# Patient Record
Sex: MALE | Race: BLACK OR AFRICAN AMERICAN | Employment: FULL TIME | URBAN - METROPOLITAN AREA
[De-identification: names, ages, dates, MRNs, and addresses within clinical notes are randomized per-mention and may not be internally consistent; named-entity substitution may affect disease eponyms.]

---

## 2020-06-25 ENCOUNTER — HOSPITAL ENCOUNTER (EMERGENCY)
Age: 45
Discharge: HOME | End: 2020-06-25
Payer: MEDICAID

## 2020-06-25 VITALS
RESPIRATION RATE: 18 BRPM | OXYGEN SATURATION: 100 % | DIASTOLIC BLOOD PRESSURE: 102 MMHG | WEIGHT: 267 LBS | SYSTOLIC BLOOD PRESSURE: 164 MMHG | TEMPERATURE: 98.8 F | HEIGHT: 73 IN | BODY MASS INDEX: 35.39 KG/M2 | HEART RATE: 79 BPM

## 2020-06-25 PROCEDURE — 99282 EMERGENCY DEPT VISIT SF MDM: CPT

## 2020-06-25 PROCEDURE — 6370000000 HC RX 637 (ALT 250 FOR IP): Performed by: PHYSICIAN ASSISTANT

## 2020-06-25 RX ORDER — IBUPROFEN 400 MG/1
800 TABLET ORAL ONCE
Status: COMPLETED | OUTPATIENT
Start: 2020-06-25 | End: 2020-06-25

## 2020-06-25 RX ORDER — NAPROXEN 500 MG/1
500 TABLET ORAL 2 TIMES DAILY WITH MEALS
Qty: 30 TABLET | Refills: 0 | Status: SHIPPED | OUTPATIENT
Start: 2020-06-25 | End: 2021-09-12 | Stop reason: SDUPTHER

## 2020-06-25 RX ORDER — PENICILLIN V POTASSIUM 500 MG/1
500 TABLET ORAL 4 TIMES DAILY
Qty: 28 TABLET | Refills: 0 | Status: SHIPPED | OUTPATIENT
Start: 2020-06-25 | End: 2020-07-02

## 2020-06-25 RX ADMIN — IBUPROFEN 800 MG: 400 TABLET, FILM COATED ORAL at 20:38

## 2020-06-25 ASSESSMENT — PAIN SCALES - GENERAL
PAINLEVEL_OUTOF10: 8
PAINLEVEL_OUTOF10: 8

## 2020-06-25 ASSESSMENT — ENCOUNTER SYMPTOMS
SORE THROAT: 0
ABDOMINAL PAIN: 0
VOICE CHANGE: 0
VOMITING: 0
COUGH: 0
NAUSEA: 0
TROUBLE SWALLOWING: 0
FACIAL SWELLING: 0
RHINORRHEA: 0
SHORTNESS OF BREATH: 0
BACK PAIN: 0
STRIDOR: 0
CHEST TIGHTNESS: 0

## 2020-06-26 NOTE — ED NOTES
Patient prepared for and ready to be discharged. Patient discharged at this time in no acute distress after verbalizing understanding of discharge instructions. Patient left after receiving After Visit Summary instructions.       Noemi Ventura RN  06/25/20 [id number]

## 2020-06-26 NOTE — ED PROVIDER NOTES
Tylenol for pain. He is referred to dental clinic. If fevers, facial swelling, trismus or worsening symptoms he can return emergency department. Patient stable for discharge. This patient was also evaluated by the attending physician. All care plans were discussed and agreed upon. Clinical Impression     1. Pain, dental    2.  Elevated blood pressure reading        Disposition     PATIENT REFERRED TO:  [facility] Emergency Department  [address]  [phone]    If symptoms worsen    dental clinic    Schedule an appointment as soon as possible for a visit       [facility] [address] [facility]    Schedule an appointment as soon as possible for a visit   for blood pressure recheck      DISCHARGE MEDICATIONS:  New Prescriptions    NAPROXEN (NAPROSYN) 500 MG TABLET    Take 1 tablet by mouth 2 times daily (with meals) for 30 doses    PENICILLIN V POTASSIUM (VEETID) 500 MG TABLET    Take 1 tablet by mouth 4 times daily for 7 days       DISPOSITION Decision To Discharge 06/25/2020 09:14:32 PM        Maite Berryma  06/25/20 8759

## 2021-09-12 ENCOUNTER — HOSPITAL ENCOUNTER (EMERGENCY)
Age: 46
Discharge: HOME | End: 2021-09-12
Attending: EMERGENCY MEDICINE
Payer: MEDICAID

## 2021-09-12 VITALS — RESPIRATION RATE: 17 BRPM | OXYGEN SATURATION: 98 % | HEART RATE: 88 BPM | TEMPERATURE: 98 F

## 2021-09-12 DIAGNOSIS — S39.012A STRAIN OF LUMBAR REGION, INITIAL ENCOUNTER: Primary | ICD-10-CM

## 2021-09-12 PROCEDURE — 6370000000 HC RX 637 (ALT 250 FOR IP): Performed by: EMERGENCY MEDICINE

## 2021-09-12 PROCEDURE — 99283 EMERGENCY DEPT VISIT LOW MDM: CPT

## 2021-09-12 RX ORDER — LIDOCAINE 50 MG/G
1 PATCH TOPICAL DAILY
Qty: 15 PATCH | Refills: 0 | Status: SHIPPED | OUTPATIENT
Start: 2021-09-12 | End: 2021-09-27

## 2021-09-12 RX ORDER — IBUPROFEN 400 MG/1
800 TABLET ORAL ONCE
Status: COMPLETED | OUTPATIENT
Start: 2021-09-12 | End: 2021-09-12

## 2021-09-12 RX ORDER — LIDOCAINE 4 G/G
1 PATCH TOPICAL ONCE
Status: DISCONTINUED | OUTPATIENT
Start: 2021-09-12 | End: 2021-09-12 | Stop reason: HOSPADM

## 2021-09-12 RX ORDER — LIDOCAINE 4 G/G
1 PATCH TOPICAL DAILY
Status: DISCONTINUED | OUTPATIENT
Start: 2021-09-13 | End: 2021-09-12

## 2021-09-12 RX ORDER — NAPROXEN 500 MG/1
500 TABLET ORAL 2 TIMES DAILY WITH MEALS
Qty: 20 TABLET | Refills: 0 | Status: SHIPPED | OUTPATIENT
Start: 2021-09-12 | End: 2021-09-22

## 2021-09-12 RX ADMIN — IBUPROFEN 800 MG: 400 TABLET, FILM COATED ORAL at 21:19

## 2021-09-12 ASSESSMENT — PAIN SCALES - GENERAL: PAINLEVEL_OUTOF10: 8

## 2021-09-12 ASSESSMENT — ENCOUNTER SYMPTOMS
BACK PAIN: 1
ABDOMINAL PAIN: 0

## 2021-09-13 NOTE — ED PROVIDER NOTES
[address] [facility]          ATTENDING PHYSICIAN NOTE       Date of evaluation: 9/12/2021    Chief Complaint     Back Pain (pt states, \"On Friday I was walking around my bed and I hit my right leg on the bed and twisted around and I think I twisted somthing in my back. \" pt states no pain reliever medications taken.)      History of Present Illness     Parvin Dennis is a 55 y.o. male who presents presenting with a complaint of low back pain. The patient states he twisted oddly a few days ago on Friday walking around his bed. He states that he felt his pelvis and legs went one way and is trunk stayed fixed in the other direction. He has since had pain on the left lower aspect of his back. There is no midline pain. There is no urinary incontinence, no dysuria, and no stool changes. He states he had good strength and the pain does not radiate past his buttock. He has tried a heating pad at home but otherwise no medications for it. He has no history of trauma or injury to the low back, or surgery on his low back or pelvis. Review of Systems     Review of Systems   Gastrointestinal: Negative for abdominal pain. Genitourinary: Negative for dysuria, flank pain and testicular pain. Musculoskeletal: Positive for back pain. Negative for gait problem, joint swelling, neck pain and neck stiffness. Neurological: Negative for weakness and numbness. All other systems reviewed and are negative. Past Medical, Surgical, Family, and Social History     He has no past medical history on file. He has no past surgical history on file. His family history is not on file. He reports that he has been smoking cigarettes. He has been smoking about 1.00 pack per day. He has never used smokeless tobacco. He reports current alcohol use. He reports current drug use. Drug: Marijuana.     Medications     Discharge Medication List as of 9/12/2021  9:06 PM          Allergies     He has No lidocaine 4 % external patch 1 patch       CONSULTS:  None    MEDICAL DECISIONMAKING / ASSESSMENT / Shae Luis Alberto is a 55 y.o. male presenting with left lower back pain after rotational injury. With no midline component, no alert neurologic deficits, and a very benign mechanism of injury, no imaging was obtained. He has not tried anything that heating pad at home. Regardless have low suspicion for cauda equina syndrome, acute spinal cord infarct, epidural abscess, and his exam does not correlate towards a disc herniation the cause of his symptoms. Therefore we will start conservative management with lidocaine patches, and round-the-clock anti-inflammatories. He is agreeable to this plan of care. He was given his first dose here in the lidocaine patch was applied. He is also given [facility]. .        Clinical Impression     1.  Strain of lumbar region, initial encounter        Disposition     PATIENT REFERRED TO:  [facility]  [address] [place] [address]  [phone]    If symptoms worsen    [address]  [address]      To establish primary care      DISCHARGE MEDICATIONS:  Discharge Medication List as of 9/12/2021  9:06 PM      START taking these medications    Details   lidocaine (LIDODERM) 5 % Place 1 patch onto the skin daily for 15 days 12 hours on, 12 hours off., Disp-15 patch, R-[id number]             DISPOSITION Decision To Discharge 09/12/2021 09:18:10 PM          Ya Michel MD  09/12/21 [id number]

## 2024-12-04 ENCOUNTER — HOSPITAL ENCOUNTER (EMERGENCY)
Age: 49
Discharge: HOME | End: 2024-12-05
Attending: EMERGENCY MEDICINE
Payer: MEDICARE

## 2024-12-04 DIAGNOSIS — K08.89 PAIN, DENTAL: Primary | ICD-10-CM

## 2024-12-04 PROCEDURE — 99283 EMERGENCY DEPT VISIT LOW MDM: CPT

## 2024-12-04 ASSESSMENT — LIFESTYLE VARIABLES
HOW OFTEN DO YOU HAVE A DRINK CONTAINING ALCOHOL: MONTHLY OR LESS
HOW MANY STANDARD DRINKS CONTAINING ALCOHOL DO YOU HAVE ON A TYPICAL DAY: 1 OR 2

## 2024-12-04 ASSESSMENT — PAIN DESCRIPTION - LOCATION: LOCATION: TEETH

## 2024-12-04 ASSESSMENT — PAIN - FUNCTIONAL ASSESSMENT: PAIN_FUNCTIONAL_ASSESSMENT: 0-10

## 2024-12-04 ASSESSMENT — PAIN DESCRIPTION - ORIENTATION: ORIENTATION: RIGHT;LOWER

## 2024-12-04 ASSESSMENT — PAIN SCALES - GENERAL: PAINLEVEL_OUTOF10: 8

## 2024-12-05 VITALS
TEMPERATURE: 98.6 F | DIASTOLIC BLOOD PRESSURE: 117 MMHG | SYSTOLIC BLOOD PRESSURE: 159 MMHG | BODY MASS INDEX: 31.97 KG/M2 | OXYGEN SATURATION: 94 % | WEIGHT: 241.2 LBS | HEIGHT: 73 IN | HEART RATE: 75 BPM | RESPIRATION RATE: 16 BRPM

## 2024-12-05 PROCEDURE — 6370000000 HC RX 637 (ALT 250 FOR IP)

## 2024-12-05 RX ORDER — NAPROXEN 250 MG/1
500 TABLET ORAL ONCE
Status: COMPLETED | OUTPATIENT
Start: 2024-12-05 | End: 2024-12-05

## 2024-12-05 RX ORDER — ACETAMINOPHEN 500 MG
1000 TABLET ORAL
Status: COMPLETED | OUTPATIENT
Start: 2024-12-05 | End: 2024-12-05

## 2024-12-05 RX ADMIN — NAPROXEN 500 MG: 250 TABLET ORAL at 00:24

## 2024-12-05 RX ADMIN — ACETAMINOPHEN 1000 MG: 500 TABLET, FILM COATED ORAL at 00:23

## 2024-12-05 NOTE — DISCHARGE INSTRUCTIONS
Today you were seen in the emergency department for pain related to your severe cavity.  This will continue to mildly until your tooth is extracted.  You have no signs of infection and do not need antibiotics.  Please call your oral surgeon to see if they can fit you in earlier for extraction. To treat your pain at home, I recommend taking either 975mg or 1000mg (three 325mg tablets or two 500mg tablets) of acetaminophen (Tylenol) three times a day. Do not take more than 4,000mg total from all sources of acetaminophen (Midol, Day-Quil, Excedrin, Mucinex, Theraflu etc) in any 24 hour period. You can also take 500mg naproxen (Aleve) twice a day or 600mg of ibuprofen every 4 to 6 hours.     Do not take NSAIDs for more than 3 to 5 days straight, as this can damage the lining of your stomach or rarely your kidneys.     Alternating heat or ice packs can also be helpful for this type of pain.     Please seek care or return to emergency department if your symptoms worsen or do not resolve. In addition, return if:  -You have a fever (greater than 101 degrees)  -You have chest pain, shortness of breath, abdominal pain, or uncontrollable vomiting  -You are unable to eat or drink  -You pass out  -You have difficulty moving your arms or legs   -You have difficulty speaking or slurred speech  -Or you have any concern that you feel needs urgent physician evaluation      Follow-Up/Future Appointments:  Polina Pickard DDS  [facility] [address]  [phone]    Schedule an appointment as soon as possible for a visit       No future appointments.  Discharge Medications:  New Prescriptions    No medications on file       Thank you for allowing me to be a part of your care today - Dr. Genao

## 2024-12-05 NOTE — ED PROVIDER NOTES
ED Attending Attestation Note     Date of evaluation: 12/4/2024    This patient was seen by the resident.  I have seen and examined the patient, agree with the workup, evaluation, management and diagnosis. The care plan has been discussed.        My assessment reveals a 49-year-old gentleman presenting with right lower dental pain.  On examination he has tenderness palpation over a right lower molar that has an associated dental carry but no evidence of any periapical abscess trismus or deep space abscess.       Ghassan Garcia MD  12/05/24 0039    
plan. Patient safe for discharge at this time.     Medical Decision Making  Risk  OTC drugs.  Prescription drug management.      Is this patient to be included in the SEP-1 core measure? No Exclusion criteria - the patient is NOT to be included for SEP-1 Core Measure due to: Infection is not suspected    This patient was also evaluated by the attending physician, Dr Garcia. All care plans were discussed and agreed upon.    Clinical Impression     1. Pain, dental        Disposition     PATIENT REFERRED TO:  Polina Pickard DDS  [facility] [address]  [phone]    Schedule an appointment as soon as possible for a visit         DISCHARGE MEDICATIONS:  Discharge Medication List as of 12/5/2024 12:40 AM          DISPOSITION Decision To Discharge 12/05/2024 12:28:31 AM   DISPOSITION CONDITION Stable           See discharge instructions for details.  ED evaluation and strict return precautions reviewed.  All questions answered.  Patient discharged in stable condition without further change clinical status.    Diagnostic Results and Other Data     RADIOLOGY:  No orders to display       LABS:   No results found for this visit on 12/04/24.  EKG   Interpreted in conjunction with emergency department physician Ghassan Garcia MD  See ED Course    RECENT VITALS:  BP: (!) 159/117, Temp: 98.6 °F (37 °C), Pulse: 75,Respirations: 16, SpO2: 94 %     Procedures     None    ED Course     The patient was given the following medications:  Orders Placed This Encounter   Medications    acetaminophen (TYLENOL) tablet 1,000 mg    naproxen (NAPROSYN) tablet 500 mg       CONSULTS:  None    Review of Systems     Review of systems negative except for as noted in the HPI.    Past Medical, Surgical, Family, and Social History     Relevant Hx:     He has no past medical history on file.  He has no past surgical history on file.  His family history is not on file.  He reports that he has been smoking cigarettes. He has never used smokeless tobacco.